# Patient Record
Sex: FEMALE | Race: WHITE | Employment: UNEMPLOYED | URBAN - NONMETROPOLITAN AREA
[De-identification: names, ages, dates, MRNs, and addresses within clinical notes are randomized per-mention and may not be internally consistent; named-entity substitution may affect disease eponyms.]

---

## 2022-06-14 ENCOUNTER — HOSPITAL ENCOUNTER (OUTPATIENT)
Dept: CT IMAGING | Age: 42
Discharge: HOME | End: 2022-06-14
Payer: COMMERCIAL

## 2022-06-14 DIAGNOSIS — R19.00 ABDOMINAL SWELLING: ICD-10-CM

## 2022-06-14 PROCEDURE — 74177 CT ABD & PELVIS W/CONTRAST: CPT

## 2022-06-14 PROCEDURE — 6360000004 HC RX CONTRAST MEDICATION: Performed by: NURSE PRACTITIONER

## 2022-06-14 RX ADMIN — IOPAMIDOL 100 ML: 755 INJECTION, SOLUTION INTRAVENOUS at 10:00

## 2022-06-14 RX ADMIN — IOHEXOL 50 ML: 240 INJECTION, SOLUTION INTRATHECAL; INTRAVASCULAR; INTRAVENOUS; ORAL at 10:01

## 2022-12-15 ENCOUNTER — HOSPITAL ENCOUNTER (EMERGENCY)
Age: 42
Discharge: HOME | End: 2022-12-15
Attending: EMERGENCY MEDICINE
Payer: COMMERCIAL

## 2022-12-15 ENCOUNTER — APPOINTMENT (OUTPATIENT)
Dept: GENERAL RADIOLOGY | Age: 42
End: 2022-12-15
Payer: COMMERCIAL

## 2022-12-15 VITALS
OXYGEN SATURATION: 98 % | SYSTOLIC BLOOD PRESSURE: 127 MMHG | WEIGHT: 120 LBS | DIASTOLIC BLOOD PRESSURE: 74 MMHG | BODY MASS INDEX: 19.99 KG/M2 | RESPIRATION RATE: 16 BRPM | HEART RATE: 76 BPM | TEMPERATURE: 98.4 F | HEIGHT: 65 IN

## 2022-12-15 DIAGNOSIS — S63.633A SPRAIN OF INTERPHALANGEAL JOINT OF LEFT MIDDLE FINGER, INITIAL ENCOUNTER: Primary | ICD-10-CM

## 2022-12-15 PROCEDURE — 99283 EMERGENCY DEPT VISIT LOW MDM: CPT

## 2022-12-15 PROCEDURE — 73130 X-RAY EXAM OF HAND: CPT

## 2022-12-15 RX ORDER — VENLAFAXINE HYDROCHLORIDE 75 MG/1
75 CAPSULE, EXTENDED RELEASE ORAL DAILY
COMMUNITY
Start: 2022-06-07

## 2022-12-15 RX ORDER — GABAPENTIN 100 MG/1
100 CAPSULE ORAL 4 TIMES DAILY
COMMUNITY
Start: 2022-05-11

## 2022-12-15 ASSESSMENT — PAIN - FUNCTIONAL ASSESSMENT: PAIN_FUNCTIONAL_ASSESSMENT: 0-10

## 2022-12-15 ASSESSMENT — PAIN SCALES - GENERAL: PAINLEVEL_OUTOF10: 3

## 2022-12-15 ASSESSMENT — PAIN DESCRIPTION - DESCRIPTORS: DESCRIPTORS: SORE

## 2022-12-15 ASSESSMENT — PAIN DESCRIPTION - ORIENTATION: ORIENTATION: LEFT

## 2022-12-15 ASSESSMENT — PAIN DESCRIPTION - LOCATION: LOCATION: FINGER (COMMENT WHICH ONE)

## 2022-12-15 NOTE — Clinical Note
Fran Shine was seen and treated in our emergency department on 12/15/2022. She may return to work on 12/19/2022. If you have any questions or concerns, please don't hesitate to call.       Leon Sparks MD
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
## 2022-12-15 NOTE — DISCHARGE INSTRUCTIONS
Please follow-up with primary care physician referral physician next 24 to 48 hours. Call to schedule appointment.     To the emergency department for increasing pain, numbness tingling coldness in your finger, any other concerning symptoms

## 2022-12-15 NOTE — ED NOTES
Finger splint applied to the left middle finger, PMS intact. Discharge instructions reviewed with patient. No additional questions asked. Voiced understanding. Encouraged patient to follow up as discussed by the ED physician.      Kodak Rivera RN  12/15/22 5248

## 2022-12-15 NOTE — ED TRIAGE NOTES
Patient back to the waiting room following triage. Stephen Dispatch contacted, requesting [employer] Officer to make a report.

## 2023-02-12 ENCOUNTER — HOSPITAL ENCOUNTER (EMERGENCY)
Age: 43
Discharge: HOME | End: 2023-02-12
Attending: EMERGENCY MEDICINE
Payer: COMMERCIAL

## 2023-02-12 ENCOUNTER — APPOINTMENT (OUTPATIENT)
Dept: GENERAL RADIOLOGY | Age: 43
End: 2023-02-12
Payer: COMMERCIAL

## 2023-02-12 VITALS
TEMPERATURE: 97.8 F | DIASTOLIC BLOOD PRESSURE: 61 MMHG | HEART RATE: 68 BPM | RESPIRATION RATE: 18 BRPM | BODY MASS INDEX: 19.29 KG/M2 | SYSTOLIC BLOOD PRESSURE: 100 MMHG | WEIGHT: 120 LBS | OXYGEN SATURATION: 98 % | HEIGHT: 66 IN

## 2023-02-12 DIAGNOSIS — M54.12 CERVICAL RADICULOPATHY: Primary | ICD-10-CM

## 2023-02-12 DIAGNOSIS — S46.811A STRAIN OF RIGHT TRAPEZIUS MUSCLE, INITIAL ENCOUNTER: ICD-10-CM

## 2023-02-12 PROCEDURE — 72050 X-RAY EXAM NECK SPINE 4/5VWS: CPT

## 2023-02-12 PROCEDURE — 96372 THER/PROPH/DIAG INJ SC/IM: CPT

## 2023-02-12 PROCEDURE — 6360000002 HC RX W HCPCS: Performed by: EMERGENCY MEDICINE

## 2023-02-12 PROCEDURE — 99284 EMERGENCY DEPT VISIT MOD MDM: CPT

## 2023-02-12 RX ORDER — DEXAMETHASONE SODIUM PHOSPHATE 10 MG/ML
10 INJECTION, SOLUTION INTRAMUSCULAR; INTRAVENOUS ONCE
Status: COMPLETED | OUTPATIENT
Start: 2023-02-12 | End: 2023-02-12

## 2023-02-12 RX ORDER — LIDOCAINE 4 G/G
1 PATCH TOPICAL DAILY
Qty: 30 PATCH | Refills: 0 | Status: SHIPPED | OUTPATIENT
Start: 2023-02-12 | End: 2023-03-14

## 2023-02-12 RX ORDER — METHYLPREDNISOLONE 4 MG/1
TABLET ORAL
Qty: 1 KIT | Refills: 0 | Status: SHIPPED | OUTPATIENT
Start: 2023-02-12

## 2023-02-12 RX ORDER — KETOROLAC TROMETHAMINE 30 MG/ML
60 INJECTION, SOLUTION INTRAMUSCULAR; INTRAVENOUS ONCE
Status: COMPLETED | OUTPATIENT
Start: 2023-02-12 | End: 2023-02-12

## 2023-02-12 RX ADMIN — KETOROLAC TROMETHAMINE 60 MG: 30 INJECTION, SOLUTION INTRAMUSCULAR at 09:56

## 2023-02-12 RX ADMIN — DEXAMETHASONE SODIUM PHOSPHATE 10 MG: 10 INJECTION, SOLUTION INTRAMUSCULAR; INTRAVENOUS at 09:55

## 2023-02-12 ASSESSMENT — LIFESTYLE VARIABLES
HOW MANY STANDARD DRINKS CONTAINING ALCOHOL DO YOU HAVE ON A TYPICAL DAY: 1 OR 2
HOW OFTEN DO YOU HAVE A DRINK CONTAINING ALCOHOL: MONTHLY OR LESS

## 2023-02-12 ASSESSMENT — PAIN SCALES - GENERAL
PAINLEVEL_OUTOF10: 8
PAINLEVEL_OUTOF10: 8

## 2023-02-12 ASSESSMENT — PAIN DESCRIPTION - LOCATION: LOCATION: SHOULDER

## 2023-02-12 ASSESSMENT — PAIN DESCRIPTION - ORIENTATION: ORIENTATION: RIGHT

## 2023-02-12 ASSESSMENT — PAIN - FUNCTIONAL ASSESSMENT: PAIN_FUNCTIONAL_ASSESSMENT: 0-10

## 2023-02-12 NOTE — ED PROVIDER NOTES
[facility]      Pt Name: Brennan Cunningham  MRN: [id number]  [place] 1980  Date of evaluation: 2/12/2023  Provider: Michel Arguelles MD  Insurance:  Payor: Anail Montalvo / Plan: Jabier Copper / Product Type: *No Product type* /   Current Code Status   Code Status: Not on file     [address] [facility]       Chief Complaint   Patient presents with    Shoulder Pain     Right constant burning and sharp pain, started 2 days ago, taking ibuprofen/pain patches but no improvement, no known injury,          HISTORY OF PRESENT ILLNESS    HPI    Nursing Notes were reviewed. This is a 36 y.o. female who presents to the emergency department with right shoulder pain for approximately 4 to 5 days. Patient says she has been having ongoing pain in her proximal right shoulder at the base of her neck radiating across her upper back. She denies trauma. She denies midline back pain. She denies headaches. She denies chest pain or difficulty breathing. She denies cough. She denies recent fevers. Patient says she has tried over-the-counter analgesia with ibuprofen with minimal improvement. She is requesting analgesia here in the emergency department. PAST MEDICAL HISTORY     Past Medical History:   Diagnosis Date    Breast abrasion augmentation    H/O breast augmentation          SURGICAL HISTORY       Past Surgical History:   Procedure Laterality Date    HERNIA REPAIR      TYMPANOSTOMY TUBE PLACEMENT           CURRENT MEDICATIONS       Previous Medications    ACETAMINOPHEN (TYLENOL 8 HOUR) 650 MG CR TABLET    Take 650 mg by mouth every 8 hours as needed for Pain. ALPRAZOLAM (XANAX) 0.25 MG TABLET    Take 0.25 mg by mouth 3 times daily as needed for Sleep. AMPHETAMINE-DEXTROAMPHETAMINE (ADDERALL) 30 MG TABLET    Take 30 mg by mouth 3 times daily. GABAPENTIN (NEURONTIN) 100 MG CAPSULE    Take 100 mg by mouth 4 times daily.     GUAIFENESIN-CODEINE (GUAIFENESIN AC) 100-10 MG/5ML LIQUID    Take 5 mLs by mouth 3 times daily as needed for Cough. NUTRITIONAL SUPPLEMENTS (VITAMIN D BOOSTER PO)    Take by mouth    TOPIRAMATE (TOPAMAX) 200 MG TABLET    Take 200 mg by mouth nightly. VENLAFAXINE (EFFEXOR XR) 75 MG EXTENDED RELEASE CAPSULE    Take 75 mg by mouth daily       ALLERGIES     Patient has no known allergies. FAMILY HISTORY     History reviewed. No pertinent family history. SOCIAL HISTORY       Social History     Socioeconomic History    Marital status:      Spouse name: None    Number of children: None    Years of education: None    Highest education level: None   Tobacco Use    Smoking status: Never   Vaping Use    Vaping Use: Every day   Substance and Sexual Activity    Alcohol use: No    Drug use: No       PHYSICAL EXAM       ED Triage Vitals [02/12/23 0925]   BP Temp Temp src Heart Rate Resp SpO2 Height Weight   98/60 97.8 °F (36.6 °C) -- 69 18 98 % 5' 5.5\" (1.664 m) 120 lb (54.4 kg)       Physical Exam  Vitals and nursing note reviewed. Constitutional:       General: She is not in acute distress. Appearance: Normal appearance. She is not toxic-appearing. HENT:      Head: Normocephalic and atraumatic. Nose: Nose normal.      Mouth/Throat:      Mouth: Mucous membranes are moist.   Eyes:      Extraocular Movements: Extraocular movements intact. Neck:      Trachea: Trachea and phonation normal.        Comments: Increased pain on right lateral movement  Cardiovascular:      Rate and Rhythm: Normal rate. Pulses: Normal pulses. Pulmonary:      Effort: Pulmonary effort is normal.      Breath sounds: Normal breath sounds. Musculoskeletal:         General: Normal range of motion. Cervical back: Normal range of motion. No signs of trauma, rigidity, torticollis or tenderness. Pain with movement and muscular tenderness present. No spinous process tenderness. Normal range of motion. Right lower leg: No edema. Left lower leg: No edema. Lymphadenopathy:      Cervical: No cervical adenopathy. Skin:     General: Skin is warm and dry. Neurological:      General: No focal deficit present. Mental Status: She is oriented to person, place, and time. Sensory: No sensory deficit. Motor: No weakness. Coordination: Coordination normal.       Vitals:    Vitals:    02/12/23 0925   BP: 98/60   Pulse: 69   Resp: 18   Temp: 97.8 °F (36.6 °C)   SpO2: 98%   Weight: 120 lb (54.4 kg)   Height: 5' 5.5\" (1.664 m)       DIAGNOSTIC RESULTS   RADIOLOGY:   Non-plain film images such as CT, Ultrasound and MRI are read by the radiologist. Plain radiographic images are visualized and preliminarily interpreted by the emergency physician with the below findings:    Interpretation per the Radiologist below, if available at the time of this note:    XR CERVICAL SPINE (4-5 VIEWS)   Final Result   Multilevel spondylosis of the cervical spine, greatest at C5-C6 and C6-C7. LABS:  Labs Reviewed - No data to display    All other labs were within normal range or not returned as of this dictation. MEDICAL DECISION MAKING     Medications   ketorolac (TORADOL) injection 60 mg (60 mg IntraMUSCular Given 2/12/23 0956)   dexamethasone (PF) (DECADRON) injection 10 mg (10 mg IntraMUSCular Given 2/12/23 0955)             [place] Coma Scale  Eye Opening: Spontaneous  Best Verbal Response: Oriented  Best Motor Response: Obeys commands  [place] Coma Scale Score: 15                     CIWA Assessment  BP: 98/60  Heart Rate: 71                 MDM  This is a 36 y.o. female who presents to the emergency department with right shoulder pain for approximately 4 to 5 days. Patient says she has been having ongoing pain in her proximal right shoulder at the base of her neck radiating across her upper back. She denies trauma. She denies midline back pain. She denies headaches. She denies chest pain or difficulty breathing.   She denies cough.  She denies recent fevers. Patient says she has tried over-the-counter analgesia with ibuprofen with minimal improvement. She is requesting analgesia here in the emergency department. Physical signs and symptoms are consistent with cervical radiculopathy. This is supported by the patient's x-ray findings of disc space narrowing. Patient was treated with Decadron and Toradol here in the emergency department with moderate improvement of her symptoms. I had a conversation with the patient regarding the likely etiology of her symptoms. I recommended outpatient steroid therapy as well as lidocaine patches for topical analgesia. I have strongly recommended that she follow-up with an orthopedist on an outpatient basis to evaluate the need for further intervention. The patient is given referral information for orthopedics. She has been encouraged to come back to the emergency department with any worsening symptoms. Clinical Diagnoses Addressed  1. Cervical radiculopathy    2. Strain of right trapezius muscle, initial encounter            CONSULTS:  None    PROCEDURES:  Unless otherwise noted below, none     Procedures      FINAL IMPRESSION      1. Cervical radiculopathy    2. Strain of right trapezius muscle, initial encounter          DISPOSITION/PLAN   DISPOSITION Decision To Discharge 02/12/2023 11:17:55 AM      PATIENT REFERRED TO:  DO Ophelia Vivas Percy Severo  [phone]    Call in 1 day  Orthopedics Followup    DISCHARGE MEDICATIONS:  New Prescriptions    LIDOCAINE 4 % EXTERNAL PATCH    Place 1 patch onto the skin daily    METHYLPREDNISOLONE (MEDROL, DELFIN,) 4 MG TABLET    Take by mouth. Controlled Substances Monitoring:     No flowsheet data found.     Hermelindo Matthew MD (electronically signed)  Attending Emergency Physician           Hermelindo Matthew MD  02/12/23 5918

## 2023-02-12 NOTE — Clinical Note
July Mendez was seen and treated in our emergency department on 2/12/2023. She may return to work on 02/13/2023. If you have any questions or concerns, please don't hesitate to call.       Sabrina Eduardo MD